# Patient Record
(demographics unavailable — no encounter records)

---

## 2025-04-30 NOTE — PHYSICAL EXAM
[MA] : MA [Well developed] : well developed [Well Nourished] : ~L well nourished [Good Hygeine] : demonstrates good hygeine [Normal Mood/Affect] : mood and affect are normal [Normal Appearance] : general appearance was normal [3] : 3 [Normal] : normal [Post Void Residual ____ml] : post void residual was [unfilled] ml [FreeTextEntry2] : [place] [Anxiety] : patient is not anxious [Tenderness] : ~T no ~M abdominal tenderness observed [Distended] : not distended

## 2025-04-30 NOTE — DISCUSSION/SUMMARY
[FreeTextEntry1] : I reviewed the above findings with the patient.  She has mixed urinary incontinence with predominant recurrent stress urinary incontinence.  Treatment options for the stress incontinence were discussed and included doing nothing, behavioral modification, Kegel's exercises with and without PT, medications, a pessary or intravaginal devices, periurethral bulking, and surgical correction with a suburethral sling v Clancy v autologous sling.  She is interested in surgical correction with a repeat mid urethral sling.  We did discuss the possibility of voiding dysfunction.  We discussed possible OAB and returning for urodynamic testing.  All questions were answered.  IUGA patient information on stress incontinence and overactive bladder was given to her

## 2025-04-30 NOTE — HISTORY OF PRESENT ILLNESS
[Vaginal Wall Prolapse] : no [Rectal Prolapse] : no [Urinary Frequency] : no [x2] : nocturia two times a night [] : yes [Uses ___ pads per day] : uses [unfilled] pad(s) per day [de-identified] : daily [de-identified] : sometimes [de-identified] : increased to reduce leakage episodes [de-identified] : sometimes [de-identified] : increaased [de-identified] : not sexually active [FreeTextEntry1] : pt reports her BHARGAVI has gotten worse since last visit pt started Keflex last night  Patient's chart was reviewed She has a history of a TVT sling which was were revised 1 month later due to voiding difficulty. She had a cystoscopy in 2019 which was negative and urodynamic testing in 2019 which showed stress urinary incontinence.. Patient reports that she is currently on Xarelto

## 2025-07-23 NOTE — HISTORY OF PRESENT ILLNESS
[FreeTextEntry1] : 65 year old presents for annual visit. She is doing well and has no complaints.   OBHx: NVSD x2 PMHx: divertic disease, h/o lung cancer PSHx: colectomy, knee surgery, s/p D+C/hysteroscopic polypectomy on 3/13/15; breast, 2014 Bilateral hip replacement (SCANNED LIST OF SURGERIES IN CHART), 8/13 R lung thorocoscopy for adenocarcinoma, 2002 breast augmentation, 5/2017transvaginalmeshplaced,5/2017 removal of transvaginal mesh.  MEDS: protonix, AMBIEN PRN, GABAPENTIN, CYMBALTA

## 2025-07-23 NOTE — PLAN
[FreeTextEntry1] : 65 year old presents for annual visit.   HCM -pap today -follows with breast surgeon   RTO 1 year

## 2025-07-23 NOTE — END OF VISIT
[FreeTextEntry3] : I, Eileen Valverde, acted as a scribe on behalf of Dr. Julienne Rice D.O. on 07/23/2025.   All medical entries made by the scribe were at my Dr. Julienne Rice D.O. direction and personally dictated by me on 07/23/2025. I have reviewed the chart and agree that the record accurately reflects my personal performance of the history, physical exam, assessment and plan. I have also personally directed, reviewed, and agreed with the chart.